# Patient Record
Sex: MALE | Race: WHITE | URBAN - METROPOLITAN AREA
[De-identification: names, ages, dates, MRNs, and addresses within clinical notes are randomized per-mention and may not be internally consistent; named-entity substitution may affect disease eponyms.]

---

## 2017-08-07 ENCOUNTER — OFFICE VISIT (OUTPATIENT)
Dept: INTERNAL MEDICINE CLINIC | Age: 27
End: 2017-08-07

## 2017-08-07 VITALS
HEIGHT: 74 IN | SYSTOLIC BLOOD PRESSURE: 104 MMHG | DIASTOLIC BLOOD PRESSURE: 70 MMHG | HEART RATE: 72 BPM | BODY MASS INDEX: 25.28 KG/M2 | WEIGHT: 197 LBS

## 2017-08-07 DIAGNOSIS — Z00.00 ROUTINE ADULT HEALTH MAINTENANCE: Primary | ICD-10-CM

## 2017-08-07 DIAGNOSIS — Z82.69 FAMILY HISTORY OF GOUT: ICD-10-CM

## 2017-08-07 DIAGNOSIS — Z92.89 H/O TRANSFUSION OF PACKED RED BLOOD CELLS: ICD-10-CM

## 2017-08-07 DIAGNOSIS — Z11.4 SCREENING FOR HIV WITHOUT PRESENCE OF RISK FACTORS: ICD-10-CM

## 2017-08-07 DIAGNOSIS — Z83.3 FAMILY HISTORY OF DIABETES MELLITUS (DM): ICD-10-CM

## 2017-08-07 DIAGNOSIS — Z83.438 FAMILY HISTORY OF ELEVATED BLOOD LIPIDS: ICD-10-CM

## 2017-08-07 PROCEDURE — 99385 PREV VISIT NEW AGE 18-39: CPT | Performed by: INTERNAL MEDICINE

## 2017-08-07 ASSESSMENT — ENCOUNTER SYMPTOMS
COUGH: 0
CHEST TIGHTNESS: 0
NAUSEA: 0
ABDOMINAL PAIN: 0
WHEEZING: 0
VOMITING: 0
EYE PAIN: 0
COLOR CHANGE: 0
EYE DISCHARGE: 0
SHORTNESS OF BREATH: 0
TROUBLE SWALLOWING: 0
RHINORRHEA: 0
VOICE CHANGE: 0

## 2017-08-07 ASSESSMENT — PATIENT HEALTH QUESTIONNAIRE - PHQ9
SUM OF ALL RESPONSES TO PHQ9 QUESTIONS 1 & 2: 0
SUM OF ALL RESPONSES TO PHQ QUESTIONS 1-9: 0
1. LITTLE INTEREST OR PLEASURE IN DOING THINGS: 0
2. FEELING DOWN, DEPRESSED OR HOPELESS: 0

## 2018-03-02 ENCOUNTER — OFFICE VISIT (OUTPATIENT)
Dept: INTERNAL MEDICINE CLINIC | Age: 28
End: 2018-03-02

## 2018-03-02 VITALS
BODY MASS INDEX: 25.67 KG/M2 | HEIGHT: 74 IN | DIASTOLIC BLOOD PRESSURE: 88 MMHG | HEART RATE: 72 BPM | SYSTOLIC BLOOD PRESSURE: 124 MMHG | WEIGHT: 200 LBS

## 2018-03-02 DIAGNOSIS — Z92.89 H/O TRANSFUSION OF PACKED RED BLOOD CELLS: ICD-10-CM

## 2018-03-02 DIAGNOSIS — S72.92XS CLOSED FRACTURE OF LEFT FEMUR, UNSPECIFIED FRACTURE MORPHOLOGY, UNSPECIFIED PORTION OF FEMUR, SEQUELA: Primary | ICD-10-CM

## 2018-03-02 DIAGNOSIS — Z83.438 FAMILY HISTORY OF ELEVATED BLOOD LIPIDS: ICD-10-CM

## 2018-03-02 DIAGNOSIS — Z82.69 FAMILY HISTORY OF GOUT: ICD-10-CM

## 2018-03-02 DIAGNOSIS — Z00.00 ROUTINE ADULT HEALTH MAINTENANCE: ICD-10-CM

## 2018-03-02 DIAGNOSIS — Z11.4 SCREENING FOR HIV WITHOUT PRESENCE OF RISK FACTORS: ICD-10-CM

## 2018-03-02 DIAGNOSIS — Z83.3 FAMILY HISTORY OF DIABETES MELLITUS (DM): ICD-10-CM

## 2018-03-02 PROBLEM — S72.92XA CLOSED FRACTURE OF LEFT FEMUR (HCC): Status: ACTIVE | Noted: 2018-03-02

## 2018-03-02 LAB
A/G RATIO: 1.8 (ref 1.1–2.2)
ALBUMIN SERPL-MCNC: 4.9 G/DL (ref 3.4–5)
ALP BLD-CCNC: 71 U/L (ref 40–129)
ALT SERPL-CCNC: 26 U/L (ref 10–40)
ANION GAP SERPL CALCULATED.3IONS-SCNC: 15 MMOL/L (ref 3–16)
AST SERPL-CCNC: 18 U/L (ref 15–37)
BILIRUB SERPL-MCNC: <0.2 MG/DL (ref 0–1)
BILIRUBIN URINE: NEGATIVE
BLOOD, URINE: NEGATIVE
BUN BLDV-MCNC: 15 MG/DL (ref 7–20)
CALCIUM SERPL-MCNC: 9.3 MG/DL (ref 8.3–10.6)
CHLORIDE BLD-SCNC: 103 MMOL/L (ref 99–110)
CLARITY: CLEAR
CO2: 25 MMOL/L (ref 21–32)
COLOR: YELLOW
CREAT SERPL-MCNC: 0.8 MG/DL (ref 0.9–1.3)
GFR AFRICAN AMERICAN: >60
GFR NON-AFRICAN AMERICAN: >60
GLOBULIN: 2.7 G/DL
GLUCOSE BLD-MCNC: 105 MG/DL (ref 70–99)
GLUCOSE URINE: NEGATIVE MG/DL
HCT VFR BLD CALC: 43 % (ref 40.5–52.5)
HEMOGLOBIN: 14.5 G/DL (ref 13.5–17.5)
KETONES, URINE: NEGATIVE MG/DL
LEUKOCYTE ESTERASE, URINE: NEGATIVE
MCH RBC QN AUTO: 28 PG (ref 26–34)
MCHC RBC AUTO-ENTMCNC: 33.6 G/DL (ref 31–36)
MCV RBC AUTO: 83.1 FL (ref 80–100)
MICROSCOPIC EXAMINATION: NORMAL
NITRITE, URINE: NEGATIVE
PDW BLD-RTO: 12.8 % (ref 12.4–15.4)
PH UA: 6
PLATELET # BLD: 169 K/UL (ref 135–450)
PMV BLD AUTO: 9.7 FL (ref 5–10.5)
POTASSIUM SERPL-SCNC: 4.1 MMOL/L (ref 3.5–5.1)
PROTEIN UA: NEGATIVE MG/DL
RBC # BLD: 5.17 M/UL (ref 4.2–5.9)
SODIUM BLD-SCNC: 143 MMOL/L (ref 136–145)
SPECIFIC GRAVITY UA: 1.01
TOTAL PROTEIN: 7.6 G/DL (ref 6.4–8.2)
TSH REFLEX: 1.58 UIU/ML (ref 0.27–4.2)
URIC ACID, SERUM: 6.1 MG/DL (ref 3.5–7.2)
URINE TYPE: NORMAL
UROBILINOGEN, URINE: 0.2 E.U./DL
WBC # BLD: 7.8 K/UL (ref 4–11)

## 2018-03-02 PROCEDURE — 99395 PREV VISIT EST AGE 18-39: CPT | Performed by: INTERNAL MEDICINE

## 2018-03-02 ASSESSMENT — ENCOUNTER SYMPTOMS
COUGH: 0
WHEEZING: 0
VOICE CHANGE: 0
BACK PAIN: 0
SHORTNESS OF BREATH: 0
VOMITING: 0
RHINORRHEA: 0
COLOR CHANGE: 0
ABDOMINAL PAIN: 0
EYE DISCHARGE: 0
NAUSEA: 0
EYE PAIN: 0
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
## 2018-03-03 LAB
ESTIMATED AVERAGE GLUCOSE: 102.5 MG/DL
HBA1C MFR BLD: 5.2 %
HEPATITIS C ANTIBODY INTERPRETATION: NORMAL

## 2018-03-05 LAB
HIV AG/AB: NORMAL
HIV ANTIGEN: NORMAL
HIV-1 ANTIBODY: NORMAL
HIV-2 AB: NORMAL